# Patient Record
Sex: FEMALE | Race: WHITE | NOT HISPANIC OR LATINO | URBAN - METROPOLITAN AREA
[De-identification: names, ages, dates, MRNs, and addresses within clinical notes are randomized per-mention and may not be internally consistent; named-entity substitution may affect disease eponyms.]

---

## 2024-03-25 ENCOUNTER — APPOINTMENT (RX ONLY)
Dept: URBAN - METROPOLITAN AREA CLINIC 127 | Facility: CLINIC | Age: 69
Setting detail: DERMATOLOGY
End: 2024-03-25

## 2024-03-25 DIAGNOSIS — L57.8 OTHER SKIN CHANGES DUE TO CHRONIC EXPOSURE TO NONIONIZING RADIATION: ICD-10-CM

## 2024-03-25 PROBLEM — C44.319 BASAL CELL CARCINOMA OF SKIN OF OTHER PARTS OF FACE: Status: ACTIVE | Noted: 2024-03-25

## 2024-03-25 PROCEDURE — 99203 OFFICE O/P NEW LOW 30 MIN: CPT

## 2024-03-25 PROCEDURE — ? ADDITIONAL NOTES

## 2024-03-25 PROCEDURE — ? COUNSELING

## 2024-03-25 ASSESSMENT — LOCATION ZONE DERM: LOCATION ZONE: FACE

## 2024-03-25 ASSESSMENT — LOCATION DETAILED DESCRIPTION DERM: LOCATION DETAILED: SUPERIOR MID FOREHEAD

## 2024-03-25 ASSESSMENT — LOCATION SIMPLE DESCRIPTION DERM: LOCATION SIMPLE: SUPERIOR FOREHEAD

## 2024-03-25 NOTE — HPI: BIOPSY PROVEN BCC
How Severe Is Your Bcc?: severe
When Was The Bcc Biopsied? (Optional): 03/07/2024
Accession # (Optional): [id number]

## 2024-03-25 NOTE — PROCEDURE: ADDITIONAL NOTES
Render Risk Assessment In Note?: no
Additional Notes: I spent 10-15 minutes counseling the patient about treatment options for this advanced, aggressive basal cell carcinoma. I discussed referral to [facility]. Surgical resection of this tumor would be very involved and likely require a multidisciplinary approach. I also discussed the possibility of Vismodegib as a non-surgical treatment option, as surgery may be cosmetically and functionally debilitating. The patient inquired about seeing Dr. Rachid Barragan here in [place] at [facility], as Dr. Barragan cared for her brother. I discussed the case briefly with Dr. Barragan and he is happy to see her. We will refer her to Dr. Barragan for consideration of Vismodegib therapy. All of her questions and concerns were addressed.
Detail Level: Simple